# Patient Record
Sex: MALE | Race: ASIAN | NOT HISPANIC OR LATINO | URBAN - METROPOLITAN AREA
[De-identification: names, ages, dates, MRNs, and addresses within clinical notes are randomized per-mention and may not be internally consistent; named-entity substitution may affect disease eponyms.]

---

## 2019-08-10 ENCOUNTER — OFFICE VISIT - HEALTHEAST (OUTPATIENT)
Dept: FAMILY MEDICINE | Facility: CLINIC | Age: 19
End: 2019-08-10

## 2019-08-10 DIAGNOSIS — J06.9 VIRAL URI WITH COUGH: ICD-10-CM

## 2019-08-10 DIAGNOSIS — R05.9 COUGH: ICD-10-CM

## 2019-08-10 RX ORDER — ALBUTEROL SULFATE 90 UG/1
2 AEROSOL, METERED RESPIRATORY (INHALATION) EVERY 6 HOURS PRN
Qty: 1 EACH | Refills: 0 | Status: SHIPPED | OUTPATIENT
Start: 2019-08-10

## 2021-05-31 NOTE — PROGRESS NOTES
Assessment and Plan     Last was seen today for cough.    Diagnoses and all orders for this visit:    Viral URI with cough    Cough  -     XR Chest 2 Views         HPI     Chief Complaint   Patient presents with     Cough       Last Salgado is a 19 y.o. male seen today for 5 days of cough, intermittent fevers.    Cough, rhinorrhea, and nasal congestion for 4 days.  Cough occasionally productive of white mucus.   Mild sore throat without dysphagia.  Emesis x 4.    No abdominal pain or diarrhea.      Current Outpatient Medications:      albuterol (PROVENTIL HFA;VENTOLIN HFA) 90 mcg/actuation inhaler, Inhale 2 puffs every 4 (four) hours as needed for wheezing., Disp: 1 Inhaler, Rfl: 0     inhalational spacing device Spcr, Use with Ventolin inhaler., Disp: 1 each, Rfl: 0     Reviewed and updated: medical history, medications and allergies.     Review of Systems     General: Denies fever, chills, fatigue.  Cardiovascular: Denies chest pain, dyspnea on exertion, palpitations.  Respiratory: Denies dyspnea, cough, wheezing.  GI: Denies nausea, vomiting, diarrhea, constipation.  : Denies dysuria, polyuria.     Objective     Vitals:    08/10/19 1239   BP: 122/70   Patient Site: Right Arm   Patient Position: Sitting   Cuff Size: Adult Large   Pulse: 98   Resp: 18   Temp: 98.8  F (37.1  C)   TempSrc: Oral   SpO2: 99%   Weight: (!) 323 lb (146.5 kg)        Reviewed vital signs.  General: Appears calm, comfortable. Answers questions quickly and appropriately with clear speech. No apparent distress.  Skin: Pink, warm, dry.  HENT: Normocephalic, atraumatic. TMs and canals clear bilaterally. No lymphadenopathy.  Neck: Supple, without lymphadenopathy or thyromegaly.  Cardiovascular: Regular rate and rhythm, clear S1/S2 without murmur, rub, or gallop.  Respiratory: Scattered rhonchi. No wheeze or crackles. Normal respiratory effort.  Neuro: Memory and cognition appear normal. Normal gait.  Psych: Mood and affect appear normal.      Imaging:   Xr Chest 2 Views    Result Date: 8/10/2019  EXAM DATE:         08/10/2019 EXAM: X-RAY CHEST, 2 VIEWS, FRONTAL AND LATERAL LOCATION: [facility] DATE/TIME: 8/10/2019 1:30 PM INDICATION: Dyspnea, cough, fever, rhonchi. COMPARISON: None. FINDINGS: Negative chest. Lungs are clear. Mild anterior thoracolumbar vertebral body wedge deformity, age indeterminate.       Labs:  No results found for this or any previous visit (from the past 24 hour(s)).     Medical Decision-Making     Last is generally well-appearing 19-year-old male with no significant medical history who presents with double days of rhinorrhea, nasal congestion, and cough.  His appearance is nontoxic.  He is not tachycardic, tachypnea, hypoxic, or febrile.  He does not exhibit increased work of breathing.  Exam is remarkable for the presence of clear rhinorrhea, clear mucus on the retropharyngeal wall with cobblestoning, and frequent nonproductive cough.  There is some scattered rhonchi in his lungs concerning for bronchitis.  Overall history and exam are consistent with a viral URI with cough.  I did have some slight concern for pneumonia with his adventitious lung sounds, however a CXR was negative.  We discussed conservative home management of viral URI including the use of OTC nasal steroids and antihistamines.  I also prescribed an albuterol MDI.    Reviewed red flags that would trigger a prompt return to the clinic as noted below under patient instructions.  He expressed understanding of these directions and is in agreement with the plan.     Patient Instructions     Patient Instructions   Please return to the clinic if you notice any of the following:    Fever of 103F or higher.    Symptoms that appear to get better, but then return with a fever and worse cough.    Difficulty breathing, either at rest or with exertion.    You can use saline nasal spray throughout the day for comfort and to help relieve nasal congestion  and dryness.    Fluticasone nasal spray (Flonase) or other steroid nasal sprays can be very helpful with congestion. Use up to twice per day, 1 spray in each nostril. Before using the steroid nasal spray, clean your nose by using the nasal saline spray, waiting 30 - 60 seconds, and blowing your nose. Repeat if needed. Then use the steroid spray. This will help remove as much mucus as possible, maximizing the effectiveness of the steroid spray.    A daily antihistamine, either loratadine (Claritin) or cetirizine (Zyrtec) can also be very helpful to reduce nasal inflammation and mucus production.        Discussed benefit vs risk of medications, dosing, side effects.  Patient was able to verbalize understanding.  After visit summary was provided for patient.     Joaquín De Dios PA-C

## 2021-06-03 VITALS — BODY MASS INDEX: 49.11 KG/M2 | WEIGHT: 315 LBS
